# Patient Record
(demographics unavailable — no encounter records)

---

## 2024-10-16 NOTE — HISTORY OF PRESENT ILLNESS
[T: ___] : T[unfilled] [N: ___] : N[unfilled] [AJCC Stage: ____] : AJCC Stage: [unfilled] [Disease: _____________________] : Disease: [unfilled] [de-identified] : 71 yo F presenting for 2nd opinion evaluation of stage 3C1 grade 2 endometrial cancer. Per pt they began having spotting in 4/24, pt then had a concerning US and then established care with [facility] Gyn-onc Dr. Chelsea Tovar, pt is now s/p hysterectomy and BSO on 8/15/24. Pt denies any current bleeding or pain.  [de-identified] : Marta has recovered well from surgery.  She is here with her friend for a second opinion.

## 2024-10-16 NOTE — HISTORY OF PRESENT ILLNESS
[T: ___] : T[unfilled] [N: ___] : N[unfilled] [AJCC Stage: ____] : AJCC Stage: [unfilled] [Disease: _____________________] : Disease: [unfilled] [de-identified] : 71 yo F presenting for 2nd opinion evaluation of stage 3C1 grade 2 endometrial cancer. Per pt they began having spotting in 4/24, pt then had a concerning US and then established care with [facility] Gyn-onc Dr. Chelsea Tovar, pt is now s/p hysterectomy and BSO on 8/15/24. Pt denies any current bleeding or pain.  [de-identified] : Marta has recovered well from surgery.  She is here with her friend for a second opinion.

## 2024-10-16 NOTE — BEGINNING OF VISIT
[0] : 2) Feeling down, depressed, or hopeless: Not at all (0) [PHQ-2 Negative] : PHQ-2 Negative [Pain Scale: ___] : On a scale of 1-10, today the patient's pain is a(n) [unfilled]. [Never] : Never [FreeTextEntry7] : NA

## 2024-10-16 NOTE — REASON FOR VISIT
[Initial Consultation] : an initial consultation [Family Member] : family member [FreeTextEntry2] : [employer]

## 2024-10-16 NOTE — HISTORY OF PRESENT ILLNESS
[T: ___] : T[unfilled] [N: ___] : N[unfilled] [AJCC Stage: ____] : AJCC Stage: [unfilled] [Disease: _____________________] : Disease: [unfilled] [de-identified] : 71 yo F presenting for 2nd opinion evaluation of stage 3C1 grade 2 endometrial cancer. Per pt they began having spotting in 4/24, pt then had a concerning US and then established care with [facility] Gyn-onc Dr. Chelsea Tovar, pt is now s/p hysterectomy and BSO on 8/15/24. Pt denies any current bleeding or pain.  [de-identified] : Marta has recovered well from surgery.  She is here with her friend for a second opinion.

## 2024-10-16 NOTE — HISTORY OF PRESENT ILLNESS
[T: ___] : T[unfilled] [N: ___] : N[unfilled] [AJCC Stage: ____] : AJCC Stage: [unfilled] [Disease: _____________________] : Disease: [unfilled] [de-identified] : 71 yo F presenting for 2nd opinion evaluation of stage 3C1 grade 2 endometrial cancer. Per pt they began having spotting in 4/24, pt then had a concerning US and then established care with [facility] Gyn-onc Dr. Chelsea Tovar, pt is now s/p hysterectomy and BSO on 8/15/24. Pt denies any current bleeding or pain.  [de-identified] : Marta has recovered well from surgery.  She is here with her friend for a second opinion.